# Patient Record
Sex: MALE | Race: WHITE | NOT HISPANIC OR LATINO | Employment: FULL TIME | URBAN - METROPOLITAN AREA
[De-identification: names, ages, dates, MRNs, and addresses within clinical notes are randomized per-mention and may not be internally consistent; named-entity substitution may affect disease eponyms.]

---

## 2021-09-24 ENCOUNTER — HOSPITAL ENCOUNTER (EMERGENCY)
Facility: CLINIC | Age: 21
Discharge: HOME | End: 2021-09-24
Attending: FAMILY MEDICINE | Admitting: FAMILY MEDICINE
Payer: COMMERCIAL

## 2021-09-24 ENCOUNTER — APPOINTMENT (OUTPATIENT)
Dept: GENERAL RADIOLOGY | Facility: CLINIC | Age: 21
End: 2021-09-24
Attending: FAMILY MEDICINE
Payer: COMMERCIAL

## 2021-09-24 VITALS
HEART RATE: 80 BPM | OXYGEN SATURATION: 98 % | SYSTOLIC BLOOD PRESSURE: 136 MMHG | RESPIRATION RATE: 26 BRPM | TEMPERATURE: 98.1 F | DIASTOLIC BLOOD PRESSURE: 87 MMHG

## 2021-09-24 DIAGNOSIS — F41.0 ANXIETY ATTACK: ICD-10-CM

## 2021-09-24 DIAGNOSIS — F45.8 HYPERVENTILATION SYNDROME: ICD-10-CM

## 2021-09-24 LAB
ALBUMIN SERPL-MCNC: 4.5 G/DL (ref 3.4–5)
ALP SERPL-CCNC: 80 U/L (ref 40–150)
ALT SERPL W P-5'-P-CCNC: 37 U/L (ref 0–70)
ANION GAP SERPL CALCULATED.3IONS-SCNC: 7 MMOL/L (ref 3–14)
AST SERPL W P-5'-P-CCNC: 17 U/L (ref 0–45)
BASE EXCESS BLDV CALC-SCNC: 3.6 MMOL/L (ref -7.7–1.9)
BASOPHILS # BLD AUTO: 0.1 10E3/UL (ref 0–0.2)
BASOPHILS NFR BLD AUTO: 1 %
BILIRUB SERPL-MCNC: 0.6 MG/DL (ref 0.2–1.3)
BUN SERPL-MCNC: 14 MG/DL (ref 7–30)
CALCIUM SERPL-MCNC: 9.6 MG/DL (ref 8.5–10.1)
CHLORIDE BLD-SCNC: 107 MMOL/L (ref 94–109)
CO2 SERPL-SCNC: 26 MMOL/L (ref 20–32)
CREAT SERPL-MCNC: 1.09 MG/DL (ref 0.66–1.25)
D DIMER PPP FEU-MCNC: 0.27 UG/ML FEU (ref 0–0.5)
EOSINOPHIL # BLD AUTO: 0.1 10E3/UL (ref 0–0.7)
EOSINOPHIL NFR BLD AUTO: 1 %
ERYTHROCYTE [DISTWIDTH] IN BLOOD BY AUTOMATED COUNT: 11.9 % (ref 10–15)
GFR SERPL CREATININE-BSD FRML MDRD: >90 ML/MIN/1.73M2
GLUCOSE BLD-MCNC: 129 MG/DL (ref 70–99)
HCO3 BLDV-SCNC: 27 MMOL/L (ref 21–28)
HCT VFR BLD AUTO: 48.1 % (ref 40–53)
HGB BLD-MCNC: 17.1 G/DL (ref 13.3–17.7)
IMM GRANULOCYTES # BLD: 0.1 10E3/UL
IMM GRANULOCYTES NFR BLD: 0 %
LYMPHOCYTES # BLD AUTO: 1.5 10E3/UL (ref 0.8–5.3)
LYMPHOCYTES NFR BLD AUTO: 11 %
MCH RBC QN AUTO: 30.2 PG (ref 26.5–33)
MCHC RBC AUTO-ENTMCNC: 35.6 G/DL (ref 31.5–36.5)
MCV RBC AUTO: 85 FL (ref 78–100)
MONOCYTES # BLD AUTO: 1.2 10E3/UL (ref 0–1.3)
MONOCYTES NFR BLD AUTO: 8 %
NEUTROPHILS # BLD AUTO: 11.6 10E3/UL (ref 1.6–8.3)
NEUTROPHILS NFR BLD AUTO: 79 %
NRBC # BLD AUTO: 0 10E3/UL
NRBC BLD AUTO-RTO: 0 /100
O2/TOTAL GAS SETTING VFR VENT: 21 %
PCO2 BLDV: 35 MM HG (ref 40–50)
PH BLDV: 7.49 [PH] (ref 7.32–7.43)
PLATELET # BLD AUTO: 333 10E3/UL (ref 150–450)
PO2 BLDV: 35 MM HG (ref 25–47)
POTASSIUM BLD-SCNC: 3.3 MMOL/L (ref 3.4–5.3)
PROT SERPL-MCNC: 8.3 G/DL (ref 6.8–8.8)
RBC # BLD AUTO: 5.66 10E6/UL (ref 4.4–5.9)
SARS-COV-2 RNA RESP QL NAA+PROBE: NEGATIVE
SODIUM SERPL-SCNC: 140 MMOL/L (ref 133–144)
TROPONIN I SERPL-MCNC: <0.015 UG/L (ref 0–0.04)
WBC # BLD AUTO: 14.6 10E3/UL (ref 4–11)

## 2021-09-24 PROCEDURE — 82803 BLOOD GASES ANY COMBINATION: CPT | Performed by: FAMILY MEDICINE

## 2021-09-24 PROCEDURE — 87635 SARS-COV-2 COVID-19 AMP PRB: CPT | Performed by: FAMILY MEDICINE

## 2021-09-24 PROCEDURE — C9803 HOPD COVID-19 SPEC COLLECT: HCPCS

## 2021-09-24 PROCEDURE — 36415 COLL VENOUS BLD VENIPUNCTURE: CPT | Performed by: FAMILY MEDICINE

## 2021-09-24 PROCEDURE — 84484 ASSAY OF TROPONIN QUANT: CPT | Performed by: FAMILY MEDICINE

## 2021-09-24 PROCEDURE — 85379 FIBRIN DEGRADATION QUANT: CPT | Performed by: FAMILY MEDICINE

## 2021-09-24 PROCEDURE — 85025 COMPLETE CBC W/AUTO DIFF WBC: CPT | Performed by: FAMILY MEDICINE

## 2021-09-24 PROCEDURE — 99284 EMERGENCY DEPT VISIT MOD MDM: CPT | Performed by: FAMILY MEDICINE

## 2021-09-24 PROCEDURE — 80053 COMPREHEN METABOLIC PANEL: CPT | Performed by: FAMILY MEDICINE

## 2021-09-24 PROCEDURE — 71045 X-RAY EXAM CHEST 1 VIEW: CPT

## 2021-09-24 PROCEDURE — 99284 EMERGENCY DEPT VISIT MOD MDM: CPT | Mod: 25

## 2021-09-25 NOTE — DISCHARGE INSTRUCTIONS
We did not find a worrisome cause for your symptoms.  Your Covid test was negative.  You had a brief episode of low oxygen saturation that corrected promptly.  Continue to monitor for further symptoms such as fever, cough, chest pain, shortness of breath or palpitations.  Turn to the emergency department at any time if you develop new or worsening symptoms.

## 2021-09-25 NOTE — ED NOTES
Patient fell asleep and had episode of spo2 at 69%. Nurse woke him up and spo2 went back up to 100%.

## 2021-09-25 NOTE — ED TRIAGE NOTES
Patient stated that started to cry at 1730 and doesn't know why. Patient states that than started to have SOB.

## 2021-09-25 NOTE — ED PROVIDER NOTES
[facility] ED Provider Note   Patient: Jhon Holly  MRN #:  [id number]  Date of Visit: September 24, 2021    CC:     Chief Complaint   Patient presents with     Shortness of Breath     HPI:  Jhon Holly is a 21 year old male who presented to the emergency department with sudden onset of crying episode, feeling tense, dizziness, and shortness of breath.  Patient states that around 5 PM this evening, he was looking at some videos on [employer] [place] and without any explanation started to cry.  He felt extremely tense, and he drove himself to his friend's house.  The way over, he started to feel dizzy, and short of breath.  He has had some intermittent shortness of breath since then.  He is a history of asthma, and has had a slight cough on and off for 2 weeks.  He has not had fever, chills, chest pain, nausea, vomiting, diarrhea, body aches, sore throat, loss of taste or smell.  Patient works as a [employer]  and is in 15-20 homes a day.  There has been no known sick exposure or contacts.  He did not have Covid previously and has not had any immunizations.  Patient is otherwise healthy.  He denies any tobacco, alcohol, illicit drug use.  He had an energy drink at about 10:00 this morning.    Problem List:  There are no problems to display for this patient.      No past medical history on file.    MEDS: No current outpatient medications on file.      ALLERGIES:  No Known Allergies    No past surgical history on file.    Social History     Tobacco Use     Smoking status: Not on file   Substance Use Topics     Alcohol use: Not on file     Drug use: Not on file         Review of Systems   Except as noted in HPI, all other systems were reviewed and are negative    Physical Exam     Vitals were reviewed  Patient Vitals for the past 12 hrs:   BP Temp Temp src Pulse Resp SpO2   09/24/21 2215 (!) 143/86 -- -- 78 -- 99 %   09/24/21 2130  (!) 143/81 -- -- 85 -- 98 %   09/24/21 2120 -- -- -- -- -- 100 %   09/24/21 2119 (!) 149/98 -- -- 90 -- --   09/24/21 2034 -- 98.1  F (36.7  C) Oral -- -- --   09/24/21 2033 (!) 144/89 -- -- 111 26 100 %     GENERAL APPEARANCE: Alert and oriented x3, no acute distress at rest  FACE: normal facies  EYES: Pupils are equal  HENT: normal external exam  NECK: no adenopathy or asymmetry  RESP: normal respiratory effort; clear breath sounds bilaterally  CV: regular rate and rhythm; no significant murmurs, gallops or rubs  ABD: soft, no tenderness; no rebound or guarding; bowel sounds are normal  MS: no gross deformities noted; normal muscle tone.  EXT: No calf tenderness or pitting edema  SKIN: no worrisome rash  NEURO: no facial droop; no focal deficits, speech is normal  PSYCH: normal mood and affect      Available Lab/Imaging Results     Results for orders placed or performed during the hospital encounter of 09/24/21 (from the past 24 hour(s))   Symptomatic COVID-19 Virus (Coronavirus) by PCR Nasopharyngeal    Specimen: Nasopharyngeal; Swab   Result Value Ref Range    SARS CoV2 PCR Negative Negative    Narrative    Testing was performed using the damaris  SARS-CoV-2 & Influenza A/B Assay on the damaris  Amber  System.  This test should be ordered for the detection of SARS-COV-2 in individuals who meet SARS-CoV-2 clinical and/or epidemiological criteria. Test performance is unknown in asymptomatic patients.  This test is for in vitro diagnostic use under the FDA EUA for laboratories certified under CLIA to perform moderate and/or high complexity testing. This test has not been FDA cleared or approved.  A negative test does not rule out the presence of PCR inhibitors in the specimen or target RNA in concentration below the limit of detection for the assay. The possibility of a false negative should be considered if the patient's recent exposure or clinical presentation suggests COVID-19.  [facility] are  certified under the Clinical Laboratory Improvement Amendments of 1988 (CLIA-88) as qualified to perform moderate and/or high complexity laboratory testing.   CBC with platelets differential    Narrative    The following orders were created for panel order CBC with platelets differential.  Procedure                               Abnormality         Status                     ---------                               -----------         ------                     CBC with platelets and d...[[id number]]  Abnormal            Final result                 Please view results for these tests on the individual orders.   Comprehensive metabolic panel   Result Value Ref Range    Sodium 140 133 - 144 mmol/L    Potassium 3.3 (L) 3.4 - 5.3 mmol/L    Chloride 107 94 - 109 mmol/L    Carbon Dioxide (CO2) 26 20 - 32 mmol/L    Anion Gap 7 3 - 14 mmol/L    Urea Nitrogen 14 7 - 30 mg/dL    Creatinine 1.09 0.66 - 1.25 mg/dL    Calcium 9.6 8.5 - 10.1 mg/dL    Glucose 129 (H) 70 - 99 mg/dL    Alkaline Phosphatase 80 40 - 150 U/L    AST 17 0 - 45 U/L    ALT 37 0 - 70 U/L    Protein Total 8.3 6.8 - 8.8 g/dL    Albumin 4.5 3.4 - 5.0 g/dL    Bilirubin Total 0.6 0.2 - 1.3 mg/dL    GFR Estimate >90 >60 mL/min/1.73m2   D dimer quantitative   Result Value Ref Range    D-Dimer Quantitative 0.27 0.00 - 0.50 ug/mL FEU    Narrative    This D-dimer assay is intended for use in conjunction with a clinical pretest probability assessment model to exclude pulmonary embolism (PE) and deep venous thrombosis (DVT) in outpatients suspected of PE or DVT. The cut-off value is 0.50 ug/mL FEU.   Troponin I   Result Value Ref Range    Troponin I <0.015 0.000 - 0.045 ug/L   Blood gas venous   Result Value Ref Range    pH Venous 7.49 (H) 7.32 - 7.43    pCO2 Venous 35 (L) 40 - 50 mm Hg    pO2 Venous 35 25 - 47 mm Hg    Bicarbonate Venous 27 21 - 28 mmol/L    Base Excess/Deficit (+/-) 3.6 (H) -7.7 - 1.9 mmol/L    FIO2 21    CBC with platelets and differential   Result  "Value Ref Range    WBC Count 14.6 (H) 4.0 - 11.0 10e3/uL    RBC Count 5.66 4.40 - 5.90 10e6/uL    Hemoglobin 17.1 13.3 - 17.7 g/dL    Hematocrit 48.1 40.0 - 53.0 %    MCV 85 78 - 100 fL    MCH 30.2 26.5 - 33.0 pg    MCHC 35.6 31.5 - 36.5 g/dL    RDW 11.9 10.0 - 15.0 %    Platelet Count 333 150 - 450 10e3/uL    % Neutrophils 79 %    % Lymphocytes 11 %    % Monocytes 8 %    % Eosinophils 1 %    % Basophils 1 %    % Immature Granulocytes 0 %    NRBCs per 100 WBC 0 <1 /100    Absolute Neutrophils 11.6 (H) 1.6 - 8.3 10e3/uL    Absolute Lymphocytes 1.5 0.8 - 5.3 10e3/uL    Absolute Monocytes 1.2 0.0 - 1.3 10e3/uL    Absolute Eosinophils 0.1 0.0 - 0.7 10e3/uL    Absolute Basophils 0.1 0.0 - 0.2 10e3/uL    Absolute Immature Granulocytes 0.1 (H) <=0.0 10e3/uL    Absolute NRBCs 0.0 10e3/uL   XR Chest Port 1 View    Narrative    EXAM: XR CHEST PORT 1 VIEW  LOCATION: [facility]  DATE/TIME: 9/24/2021 10:11 PM    INDICATION: Cough; SOA  COMPARISON: None available      Impression    IMPRESSION: AP views of the chest were obtained. Cardiomediastinal silhouette is within normal limits. No suspicious focal pulmonary opacities. No significant pleural effusion or pneumothorax.              Impression     Final diagnoses:   Hyperventilation syndrome   Anxiety attack         ED Course & Medical Decision Making   Jhon Holly is a 21 year old male who presented to the emergency department with acute onset of crying episode, feeling tense and anxious, dizziness, and shortness of breath.  Patient was looking at videos on [employer] [place] when he suddenly started crying without any known triggers.  He then became very \"tense\" and short of breath.  He went to his friends house, and his friend's mother drove him to the ER.  Patient has a history of asthma and had a slight cough on and off for the past 2 weeks.  He does not have any other symptoms tonight.  Patient adamantly denies any tobacco, alcohol, vaping, " huffing, or any illicit drug use.  Patient had a brief episode of hypoxemia in the ED when he was in a deep sleep.  His oxygen levels promptly normalized after he was awakened.  The patient's work-up reveals a CBC with white blood count of 14.6, 79% neutrophils, normal hemoglobin and platelet count.  Comprehensive metabolic panel was normal except for slightly low potassium of 3.3, glucose of 129.  D-dimer 0.27, troponins less than 0.015.  Venous blood gas reveals a pH of 7.49, PCO2 of 35, PO2 of 35.  SARS-CoV-2 PCR test is negative.  Chest x-ray reveals no acute cardiopulmonary findings.  The patient's symptoms are suspicious for acute anxiety attack/hyperventilation syndrome.  I asked him to closely monitor for further symptoms and return to the emergency department at any time if he develops new or worsening symptoms.  Patient is stable for discharge home.      Written after-visit summary and instructions were given at the time of discharge.    Follow up Plan:   [facility]  [address] [place] [id number] [phone]    If symptoms worsen      Discharge Instructions:   We did not find a worrisome cause for your symptoms.  Your Covid test was negative.  You had a brief episode of low oxygen saturation that corrected promptly.  Continue to monitor for further symptoms such as fever, cough, chest pain, shortness of breath or palpitations.  Turn to the emergency department at any time if you develop new or worsening symptoms.       Disclaimer: This note consists of words and symbols derived from keyboarding and dictation using voice recognition software.  As a result, there may be errors that have gone undetected.  Please consider this when interpreting information found in this note.       Cat Davies MD  09/24/21 [id number]